# Patient Record
(demographics unavailable — no encounter records)

---

## 2024-10-24 NOTE — PHYSICAL EXAM
[Normal] : alert, normal voice/communication, healthy appearing, no acute distress [Sclera] : the sclera and conjunctiva were normal [Hearing Threshold Finger Rub Not [place]] : hearing was normal [Normal Appearance] : the appearance of the neck was normal [No Respiratory Distress] : no respiratory distress [Abdomen Tenderness] : non-tender [No Masses] : no abdominal mass palpated [Abdomen Soft] : soft [Abnormal Walk] : normal gait [Oriented To Time, Place, And Person] : oriented to person, place, and time

## 2024-10-24 NOTE — HISTORY OF PRESENT ILLNESS
[FreeTextEntry1] : 27 YO F without significant PMH presents to clinic today with bloating.   Reports severe bloating for the past few years. When she is bloated, she will also experience lower abdominal pain. Has not been able to identify triggers. While in [place] had severe bloating that impaired her activity. Reports intermittent constipation and rectal bleeding, typically has 1 formed BM daily, sometimes with straining. Typically feels complete evacuation. Has found that bloating is worse when she is more constipated. Reports adequate hydration, eats a lot of dietary fiber, and routine physical activity. Was previously also experiencing epigastric pain, took famotidine for 1 month prescribed by PCP and that pain has since resolved.  Denies nausea, vomiting, unintentional weight loss, dysphagia, change in bowel habits.     Referred by: Dr. Morgan ([facility])   PMH: denies  PSH: appendectomy  Family history: denies GI malignancy, celiac, IBD Allergies: NKDA Medications: denies Supplements/OTC: denies  AC or NSAIDs: denies    Colon Cancer Screening: denies  EGD: denies  Labs: 1/24 cbc, cmp, a1c, tsh wnl  Imaging: denies    Tobacco: denies  Alcohol: once/week  Drugs: rarely marijuana

## 2024-10-24 NOTE — ASSESSMENT
[FreeTextEntry1] : 29 YO F without significant PMH presents to clinic today with bloating and intermittent rectal bleeding.  #Bloating, ongoing for several years #Constipation, intermittent  - 1/24 cbc, cmp, a1c, tsh wnl - order celiac labs and h pylori breath test today - referral to dietitian to assist with low FODMAP diet - Advise adequate hydration and routine physical activity. - Recommend fiber but discussed possible bloating s/e if unable to increase hydration - if symptoms improve after prep for colonoscopy, will work on daily bowel regimen - if symptoms persist after prep, consider SIBO testing   #Rectal Bleeding, intermittent bright red blood in stool when she is more constipated, ongoing for a few years - Schedule patient for colonoscopy at [facility] with Sutab prep. - Discussed R/A/I/B with patient. Education provided on preparation instructions and the need for an escort.  Follow up post procedure

## 2024-12-23 NOTE — HISTORY OF PRESENT ILLNESS
[Home] : at home, [unfilled] , at the time of the visit. [Medical Office: ([facility])___] : at the medical office located in  [Verbal consent obtained from patient] : the patient, [unfilled] [FreeTextEntry1] : 27 YO F without significant PMH presents for video follow up to discuss colonoscopy.  Reports her bloating and constipation resolves when she avoids a certain wheat in her diet. If she eats wheat, she has increased bloating and constipation and will then note some blood while wiping since she is more constipated. Denies abd pain, nausea, vomiting, weight loss, dysphagia. When eating wheat she is having 1 BM every 2 days. She is planning to determine which wheat causes increased symptoms.   CSCOPE: entire examined colon and examined portion of ileum is normal. no specimens collected. Repeat colonoscopy at 45 for screening purposes.   HPI: Reports severe bloating for the past few years. When she is bloated, she will also experience lower abdominal pain. Has not been able to identify triggers. While in [place] had severe bloating that impaired her activity. Reports intermittent constipation and rectal bleeding, typically has 1 formed BM daily, sometimes with straining. Typically feels complete evacuation. Has found that bloating is worse when she is more constipated. Reports adequate hydration, eats a lot of dietary fiber, and routine physical activity. Was previously also experiencing epigastric pain, took famotidine for 1 month prescribed by PCP and that pain has since resolved.  Denies nausea, vomiting, unintentional weight loss, dysphagia, change in bowel habits.     Referred by: Dr. Morgan ([facility])   PMH: denies  PSH: appendectomy  Family history: denies GI malignancy, celiac, IBD Allergies: NKDA Medications: denies Supplements/OTC: denies  AC or NSAIDs: denies    Colon Cancer Screening: denies  EGD: denies  Labs: 1/24 cbc, cmp, a1c, tsh wnl  Imaging: denies    Tobacco: denies  Alcohol: once/week  Drugs: rarely marijuana

## 2024-12-23 NOTE — ASSESSMENT
[FreeTextEntry1] : 27 YO F without significant PMH presents for video follow up to discuss colonoscopy with improved symptoms.  #Bloating,improved #Constipation, improved - 1/24 cbc, cmp, a1c, tsh wnl - celiac labs neg - h pylori breath test neg - following with dietitian to assist with low FODMAP diet - Advise adequate hydration and routine physical activity. - continue avoiding gluten, as she noted resolved symptoms with this elimination   #Rectal Bleeding, mostly resolved, still occasionally occurs only while wiping with increased constipation when eating more wheat - CSCOPE: entire examined colon and examined portion of ileum is normal. no specimens collected.  -Repeat colonoscopy at 45 for screening purposes.  Follow up as needed